# Patient Record
Sex: MALE | Race: WHITE | Employment: OTHER | URBAN - METROPOLITAN AREA
[De-identification: names, ages, dates, MRNs, and addresses within clinical notes are randomized per-mention and may not be internally consistent; named-entity substitution may affect disease eponyms.]

---

## 2018-01-01 ENCOUNTER — HOSPITAL ENCOUNTER (OUTPATIENT)
Dept: INFUSION THERAPY | Age: 73
Discharge: HOME | End: 2018-02-07
Payer: MEDICARE

## 2018-01-01 VITALS
RESPIRATION RATE: 18 BRPM | SYSTOLIC BLOOD PRESSURE: 144 MMHG | HEART RATE: 85 BPM | OXYGEN SATURATION: 98 % | DIASTOLIC BLOOD PRESSURE: 75 MMHG | TEMPERATURE: 97 F

## 2018-01-01 PROCEDURE — 74011250636 HC RX REV CODE- 250/636: Performed by: UROLOGY

## 2018-01-01 PROCEDURE — 96402 CHEMO HORMON ANTINEOPL SQ/IM: CPT

## 2018-01-01 RX ADMIN — LEUPROLIDE ACETATE 22.5 MG: KIT SUBCUTANEOUS at 15:08

## 2018-01-29 PROBLEM — C79.51 BONE METASTASES (HCC): Status: ACTIVE | Noted: 2018-01-01

## 2018-02-07 NOTE — PROGRESS NOTES
[facility] Progress Note    Date: 2018    Name: Candido Foster    MRN: [id number]         : 1945     Eligard injection      Mr. Shana Carl arrived to [facility] at 80. Mr. Shana Carl was assessed and education was provided. Mr. Madeline Arnett vitals were reviewed. Visit Vitals    /75 (BP 1 Location: Right arm, BP Patient Position: Sitting)    Pulse 85    Temp 97 °F (36.1 °C)    Resp 18    SpO2 98%           Eligard 22.5 mg was administered as ordered SQ in patient's Right abdomen ( right). Mr. Shana Carl tolerated well without complaints. Mr. Shana Carl was discharged from [place] [address] in stable condition at 1515. He is to return on 2018 at 1400 for his next appointment.     Severa Fu, RN  2018

## 2018-04-18 PROBLEM — S72.009A FEMORAL NECK FRACTURE (HCC): Status: ACTIVE | Noted: 2018-01-01

## 2018-06-20 PROBLEM — C79.51 PROSTATE CANCER METASTATIC TO BONE (HCC): Status: ACTIVE | Noted: 2018-01-01

## 2018-06-20 PROBLEM — C61 PROSTATE CANCER METASTATIC TO BONE (HCC): Status: ACTIVE | Noted: 2018-01-01

## 2018-06-20 PROBLEM — K56.609 COLONIC OBSTRUCTION (HCC): Status: ACTIVE | Noted: 2018-01-01

## 2018-06-20 PROBLEM — K80.00 ACUTE CHOLECYSTITIS DUE TO BILIARY CALCULUS: Status: ACTIVE | Noted: 2018-01-01

## 2018-06-20 PROBLEM — A41.9 SEPSIS (HCC): Status: ACTIVE | Noted: 2018-01-01

## 2018-08-07 ENCOUNTER — APPOINTMENT (OUTPATIENT)
Dept: INFUSION THERAPY | Age: 73
End: 2018-08-07